# Patient Record
Sex: MALE | Race: WHITE | Employment: FULL TIME | URBAN - METROPOLITAN AREA
[De-identification: names, ages, dates, MRNs, and addresses within clinical notes are randomized per-mention and may not be internally consistent; named-entity substitution may affect disease eponyms.]

---

## 2017-01-27 PROBLEM — J45.40 MODERATE PERSISTENT ASTHMA WITHOUT COMPLICATION: Status: ACTIVE | Noted: 2017-01-27

## 2018-08-28 PROBLEM — M54.16 LEFT LUMBAR RADICULOPATHY: Status: ACTIVE | Noted: 2018-08-28

## 2018-09-25 ENCOUNTER — HOSPITAL ENCOUNTER (OUTPATIENT)
Dept: PAIN MANAGEMENT | Age: 53
Discharge: HOME | End: 2018-09-25
Payer: COMMERCIAL

## 2018-09-25 VITALS
WEIGHT: 183 LBS | RESPIRATION RATE: 16 BRPM | BODY MASS INDEX: 28.72 KG/M2 | OXYGEN SATURATION: 98 % | HEIGHT: 67 IN | TEMPERATURE: 98.6 F | DIASTOLIC BLOOD PRESSURE: 77 MMHG | SYSTOLIC BLOOD PRESSURE: 145 MMHG | HEART RATE: 89 BPM

## 2018-09-25 DIAGNOSIS — M51.36 DDD (DEGENERATIVE DISC DISEASE), LUMBAR: ICD-10-CM

## 2018-09-25 DIAGNOSIS — M54.16 LEFT LUMBAR RADICULOPATHY: Primary | ICD-10-CM

## 2018-09-25 DIAGNOSIS — J45.40 MODERATE PERSISTENT ASTHMA WITHOUT COMPLICATION: ICD-10-CM

## 2018-09-25 DIAGNOSIS — M21.372 FOOT DROP, LEFT: ICD-10-CM

## 2018-09-25 DIAGNOSIS — M48.061 SPINAL STENOSIS OF LUMBAR REGION WITHOUT NEUROGENIC CLAUDICATION: ICD-10-CM

## 2018-09-25 PROCEDURE — 99203 OFFICE O/P NEW LOW 30 MIN: CPT

## 2018-09-25 PROCEDURE — 99245 OFF/OP CONSLTJ NEW/EST HI 55: CPT | Performed by: PAIN MEDICINE

## 2018-09-25 RX ORDER — HYDROCODONE BITARTRATE AND ACETAMINOPHEN 5; 325 MG/1; MG/1
1 TABLET ORAL EVERY 6 HOURS PRN
COMMUNITY
End: 2018-10-19 | Stop reason: ALTCHOICE

## 2018-09-25 RX ORDER — IBUPROFEN 200 MG
200 TABLET ORAL EVERY 6 HOURS PRN
COMMUNITY

## 2018-09-25 ASSESSMENT — ENCOUNTER SYMPTOMS
BACK PAIN: 1
HEARTBURN: 0
VOMITING: 0
PHOTOPHOBIA: 0
NAUSEA: 0
EYES NEGATIVE: 1
COUGH: 0
BLURRED VISION: 0
RESPIRATORY NEGATIVE: 1
ABDOMINAL PAIN: 0
GASTROINTESTINAL NEGATIVE: 1
HEMOPTYSIS: 0

## 2018-09-25 ASSESSMENT — PAIN DESCRIPTION - DESCRIPTORS: DESCRIPTORS: ACHING;CONSTANT;DULL;JABBING;NAGGING;SHARP

## 2018-09-25 ASSESSMENT — PAIN DESCRIPTION - LOCATION: LOCATION: BACK;HIP;LEG

## 2018-09-25 ASSESSMENT — PAIN DESCRIPTION - ONSET: ONSET: ON-GOING

## 2018-09-25 ASSESSMENT — PAIN SCALES - GENERAL: PAINLEVEL_OUTOF10: 4

## 2018-09-25 ASSESSMENT — PAIN DESCRIPTION - DIRECTION: RADIATING_TOWARDS: LEFT LEG

## 2018-09-25 ASSESSMENT — ACTIVITIES OF DAILY LIVING (ADL): EFFECT OF PAIN ON DAILY ACTIVITIES: UNABLE TO WALK ONE BLOCK WITHOUT SEVERE PAIN

## 2018-09-25 ASSESSMENT — PAIN DESCRIPTION - ORIENTATION: ORIENTATION: LEFT

## 2018-09-25 ASSESSMENT — PAIN DESCRIPTION - FREQUENCY: FREQUENCY: CONTINUOUS

## 2018-09-25 ASSESSMENT — PAIN DESCRIPTION - PAIN TYPE: TYPE: CHRONIC PAIN

## 2018-09-25 NOTE — PROGRESS NOTES
[facility] Pain Management  Patient Pain Assessment  Consultation - Dr. Anita Vitale    Primary Care Physician: Lucia Bob MD    Chief complaint:   Chief Complaint   Patient presents with    Lower Back Pain     left hip and leg   . HISTORY OF PRESENT ILLNESS:  Angel Luis Lane is 47 y.o. male referred to the pain clinic in consultation for back pain by Valdo Abad MD    Patient is a 25-year-old male who has a history of low back pain of long-standing duration. Patient reports he used to get spasms in his left calf and long time. Patient reports he was pulling an anchor off of both and bent over and developed severe pain Next day in his low back. The pain radiates towards the left lower extremity. Patient reports he was taking Motrin and Norco for pain. He reports that he is developing severe charley horses in the calf on the left side at night. Patient's pain is decreased a certain extent with medications and lying      Back Pain   This is a chronic problem. The current episode started more than 1 month ago. The problem occurs constantly. The problem is unchanged. The pain is present in the lumbar spine and sacro-iliac. The quality of the pain is described as shooting (sharp). The pain radiates to the left foot, left thigh and left knee. The pain is at a severity of 5/10 (1-8). The pain is moderate. The symptoms are aggravated by standing, coughing and bending (walking). Associated symptoms include numbness, tingling and weakness. Pertinent negatives include no abdominal pain, bladder incontinence, chest pain, dysuria, fever or weight loss. (In toes) Risk factors include lack of exercise. OARRS compliant?  yes  Concern for prescription abuse?no    Current Pain Assessment  Pain Assessment  Pain Assessment: 0-10  Pain Level: 4  Pain Type: Chronic pain  Pain Location: Back, Hip, Leg  Pain Orientation: Left  Pain Radiating Towards: left leg  Pain Descriptors: Aching, Constant, Dull, Jabbing, Nagging, Sharp  Pain Frequency: Continuous  Pain Onset: On-going  Effect of Pain on Daily Activities: unable to walk one block without severe pain   Patient's Stated Pain Goal: 2 (to be able to walk more than one block without severe pain)  Pain Intervention(s): Medication (see eMar), Repositioned, Rest                    ADVERSE MEDICATION EFFECTS:   Constipation: no  Bowel Regimen: No:   Diet: common adult  Appetite:  ok  Sedation:  no  Urinary Retention: no    FOCUSED PAIN SCALE:  Highest : 8  Lowest :1  Average: Range-5  When and What  was your last procedure:      Was your procedure effective:  no    ACTIVITY/SOCIAL/EMOTIONAL:  Sleep Pattern: 8 hours per night. generally restful sleep  Energy Level:  Normal  Currently attending Physical Therapy:  No  Home Exercises: daily walking  Mobility: no current mobility problem   Currently seeing a Psychiatrist or Psychologist:  No  Emotional Issues: normal   Mood: appropriate     ABERRANT BEHAVIORS SINCE LAST VISIT:  Have you ever been treated in another Pain Clinic yes  Refills for prescriptions appropriate: not applicable  Lost rx/pills: not applicable  Taking more medication than prescribed:  not applicable  Are you receiving PAIN medications from  other doctors: yes tayler Townsend  Last Urine/Serum Drug Screen :  Was Serum/UDS as anticipated?  not applicable  Brought pill bottles in :not applicable   Was Pill count appropriate? :not applicable   Are currently pregnant? no  Recent ER visits: No             Past Medical History      Diagnosis Date    Asthma        Surgical History  Past Surgical History:   Procedure Laterality Date    TONSILLECTOMY         Medications  Current Outpatient Prescriptions   Medication Sig Dispense Refill    HYDROcodone-acetaminophen (NORCO) 5-325 MG per tablet Take 1 tablet by mouth every 6 hours as needed for Pain. [place] ibuprofen (ADVIL;MOTRIN) 200 MG tablet Take 200 mg by mouth every 6 hours as needed for Pain      fluticasone-salmeterol (ADVAIR DISKUS) 250-50 MCG/DOSE AEPB INHALE ONE DOSE BY MOUTH TWICE DAILY 3 Inhaler 3    albuterol sulfate HFA (PROAIR HFA) 108 (90 Base) MCG/ACT inhaler Inhale 2 puffs into the lungs every 6 hours as needed for Wheezing 3 Inhaler 3     No current facility-administered medications for this encounter. Allergies  Patient has no known allergies. Family History  family history includes COPD in his father; Coronary Art Dis in his mother. Social History  Social History     Social History    Marital status: Unknown     Spouse name: N/A    Number of children: N/A    Years of education: N/A     Social History Main Topics    Smoking status: Never Smoker    Smokeless tobacco: Current User     Types: Chew    Alcohol use Yes      Comment: Socially    Drug use: No    Sexual activity: Not Asked     Other Topics Concern    None     Social History Narrative    None      reports that he does not use drugs. REVIEW OF SYSTEMS:  Review of Systems   Constitutional: Negative for chills, fever and weight loss. HENT: Negative. Negative for hearing loss and nosebleeds. Eyes: Negative. Negative for blurred vision and photophobia. Respiratory: Negative. Negative for cough and hemoptysis. Copd OK   Cardiovascular: Negative. Negative for chest pain and palpitations. Gastrointestinal: Negative. Negative for abdominal pain, heartburn, nausea and vomiting. Genitourinary: Negative. Negative for bladder incontinence, dysuria, frequency and hematuria. Musculoskeletal: Positive for back pain and myalgias. Skin: Negative. Negative for itching and rash. Neurological: Positive for tingling, focal weakness, weakness and numbness. Negative for dizziness and tremors. Endo/Heme/Allergies: Negative. Does not bruise/bleed easily. Psychiatric/Behavioral: Negative. Negative for depression and substance abuse.             GENERAL PHYSICAL EXAM:  Vitals: BP (!) 145/77 The patient has normal right knee strength. Left Knee Exam     Muscle Strength     The patient has normal left knee strength. Right Hip Exam     Muscle Strength   The patient has normal right hip strength. Left Hip Exam     Muscle Strength   The patient has normal left hip strength. Back Exam     Tenderness   The patient is experiencing tenderness in the lumbar and sacroiliac. Range of Motion   Back extension: Limited and painful. Back flexion: Limited and painful. Back lateral bend right: Limited and painful. Back lateral bend left: Limited and painful. Back rotation right: Limited and painful. Back rotation left: Limited and painful. Tests   Straight leg raise right: negative  Straight leg raise left: positive    Other   Toe Walk: abnormal  Heel Walk: abnormal  Sensation: normal  Gait: antalgic   Erythema: no back redness  Scars: absent    Comments:  Skin --normal appearance  Surgical Scar --Absent   kyphosis absent and scoliosis absent  Alignment of her shoulders, scapulae and iliac crests--symmetric  Paraspinal tenderness:Present  Lumbar lordosis-----------Decreased  Movements of the lumbar spine indicated above are diminished range with pain   Facet loading---  : Right side--    Pain-Moderate                                   Left side----   Pain  Moderate  Yordy's test -------------- Right side---negative                                           Left side----positive            Nurses Notes and Vital Signs reviewed. DATA    Imaging:  Radiology Images and Reports reviewed where indicated and necessary    Patient Active Problem List   Diagnosis    Moderate persistent asthma without complication    Left lumbar radiculopathy        ASSESSMENT    Niall Stuart is a 47 y.o. male with     1. Left lumbar radiculopathy    2. Foot drop, left    3. DDD (degenerative disc disease), lumbar    4. Spinal stenosis of lumbar region without neurogenic claudication    5.  Moderate

## 2018-09-28 ENCOUNTER — HOSPITAL ENCOUNTER (OUTPATIENT)
Dept: PAIN MANAGEMENT | Age: 53
Discharge: HOME | End: 2018-09-28
Payer: COMMERCIAL

## 2018-09-28 ENCOUNTER — HOSPITAL ENCOUNTER (OUTPATIENT)
Dept: GENERAL RADIOLOGY | Age: 53
Discharge: HOME | End: 2018-09-30
Payer: COMMERCIAL

## 2018-09-28 VITALS
RESPIRATION RATE: 16 BRPM | WEIGHT: 183 LBS | HEIGHT: 67 IN | SYSTOLIC BLOOD PRESSURE: 142 MMHG | DIASTOLIC BLOOD PRESSURE: 87 MMHG | HEART RATE: 76 BPM | OXYGEN SATURATION: 97 % | BODY MASS INDEX: 28.72 KG/M2 | TEMPERATURE: 98.8 F

## 2018-09-28 DIAGNOSIS — M54.16 LEFT LUMBAR RADICULOPATHY: ICD-10-CM

## 2018-09-28 DIAGNOSIS — M21.372 FOOT DROP, LEFT: ICD-10-CM

## 2018-09-28 DIAGNOSIS — M51.36 DDD (DEGENERATIVE DISC DISEASE), LUMBAR: ICD-10-CM

## 2018-09-28 DIAGNOSIS — M48.061 SPINAL STENOSIS OF LUMBAR REGION WITHOUT NEUROGENIC CLAUDICATION: ICD-10-CM

## 2018-09-28 DIAGNOSIS — M54.16 LEFT LUMBAR RADICULOPATHY: Primary | ICD-10-CM

## 2018-09-28 PROCEDURE — 64483 NJX AA&/STRD TFRM EPI L/S 1: CPT

## 2018-09-28 PROCEDURE — 64483 NJX AA&/STRD TFRM EPI L/S 1: CPT | Performed by: PAIN MEDICINE

## 2018-09-28 PROCEDURE — 3209999900 FLUORO FOR SURGICAL PROCEDURES

## 2018-09-28 PROCEDURE — 64484 NJX AA&/STRD TFRM EPI L/S EA: CPT | Performed by: PAIN MEDICINE

## 2018-09-28 ASSESSMENT — PAIN DESCRIPTION - PAIN TYPE: TYPE: CHRONIC PAIN

## 2018-09-28 ASSESSMENT — PAIN DESCRIPTION - LOCATION: LOCATION: BACK

## 2018-09-28 ASSESSMENT — PAIN DESCRIPTION - ONSET: ONSET: ON-GOING

## 2018-09-28 ASSESSMENT — PAIN DESCRIPTION - ORIENTATION: ORIENTATION: LEFT

## 2018-09-28 ASSESSMENT — PAIN SCALES - GENERAL
PAINLEVEL_OUTOF10: 5
PAINLEVEL_OUTOF10: 5

## 2018-09-28 ASSESSMENT — PAIN DESCRIPTION - PROGRESSION: CLINICAL_PROGRESSION: NOT CHANGED

## 2018-09-28 ASSESSMENT — PAIN DESCRIPTION - DIRECTION: RADIATING_TOWARDS: DOWN TO LEFT FOOT

## 2018-09-28 ASSESSMENT — PAIN DESCRIPTION - FREQUENCY: FREQUENCY: CONTINUOUS

## 2018-09-28 NOTE — PROCEDURES
Pre-Procedure Note    Patient Name: Bishop Parsons   YOB: 1965  Room/Bed: Room/bed info not found  Medical Record Number: [id number]  Date: 9/28/2018       Indication:  Active Problems:    * No active hospital problems. *  Resolved Problems:    * No resolved hospital problems. *       Consent: On file. Vital Signs:   Vitals:    09/28/18 1013   BP: 139/75   Pulse: 80   Resp: 14   Temp:    SpO2: 97%       Past Medical History:   has a past medical history of Asthma. Past Surgical History:   has a past surgical history that includes Tonsillectomy. Pre-Sedation Documentation and Exam:   Vital signs have been reviewed (see flow sheet for vitals). Mallampati Airway Assessment:  normal    ASA Classification:  Class 1 - A normal healthy patient    Sedation/ Anesthesia Plan:   intravenous sedation  as needed. Medications Planned:   midazolam (Versed) / Fentanyl  Intravenously  as needed. Patient is an appropriate candidate for plan of sedation: yes  Patient's History and Physical examination was reviewed and there is no change. Electronically signed by Willa Winkler MD on 9/28/2018 at 10:19 AM        Preoperative Diagnosis:    1. Left lumbar radiculopathy    2. DDD (degenerative disc disease), lumbar    3. Spinal stenosis of lumbar region without neurogenic claudication    4. Foot drop, left         Postoperative diagnosis:    1. Left lumbar radiculopathy    2. DDD (degenerative disc disease), lumbar    3. Spinal stenosis of lumbar region without neurogenic claudication    4. Foot drop, left         Procedure Performed:  Transforaminal lumbar epidural steroid injection at the levels of L5-S1 Left side under fluoroscopic guidance without IV sedation. Indication for the Procedure: The patient failed conservative management  for pain in the low back radiating to lower extremities.   .  As the patient is not responding to conservative management and pain is interfering with activities of daily living, we decided to proceed with transforaminal lumbar epidural steroid injection as symptoms are mostly following the nerve root distribution. The procedure and the risks were discussed with the patient and informed consent was obtained. Current Pain Assessment  Pain Assessment  Pain Assessment: 0-10  Pain Level: 5  Pain Type: Chronic pain  Pain Location: Back  Pain Orientation: Left  Pain Radiating Towards: down to left foot  Pain Descriptors: Aching, Constant, Dull, Sharp, Stabbing, Jabbing, Nagging  Pain Frequency: Continuous  Pain Onset: On-going  Clinical Progression: Not changed  Effect of Pain on Daily Activities: unable to walk long distances without severe pain  Patient's Stated Pain Goal: 1 (to be able to walk without pain)  Pain Intervention(s): Medication (see eMar), Repositioned, Rest, Heat applied, Cold applied, Massage     Procedure: The patient's vital signs including BP, EKG and SaO2 were monitored by the RN and they remained stable during the procedure. A meaningful communication was kept up with the patient throughout   the procedure. The patient is placed in prone position. The skin over the back was prepped and draped in sterile manner. Then the skin and deep tissues just below the tip of the transverse process of L-5 vertebra on Left side were infiltrated with about 4 ml of 1% lidocaine. The #20-gauge, 3-1/2 inch Tuohy needle/#22-gauge, 5 inch spinal needle was inserted through the skin wheal  and under fluoroscopy guidance was directed such that the tip of the needle lies in the neuroforamina at about the 6 o'clock position under the pedicle of the L-5 vertebra. This was confirmed with AP and lateral views of the fluoroscopy. Then after negative aspiration a total of 2 ml of Omnipaque-180 was injected through the needle and spread of the contrast in the epidural space as well as along the nerve root was observed.    Then after negative aspiration a total of 80 mg of triamcinolone and 2 ml of normal saline was injected through the needle. The needle is removed and a Band-Aid was placed over the needle  insertion site. The patient's vital signs remained stable and the patient tolerated the procedure well. The patient was discharged home in stable condition and will be followed in the pain clinic in the next few weeks for further planning.     Electronically signed by Keri Thompson MD on 9/28/2018 at 10:19 AM

## 2018-09-28 NOTE — PROGRESS NOTES
Discharge criteria met. Patient alert and oriented x3  Post procedure dressing dry and intact. Sensory and motor function intact as per pre-procedure. Instructions and follow up reviewed with pt at patient at discharge.   Patient discharged ambulatory @ 10:28am      Patient discharged ambulatory accompanied by wife

## 2018-10-01 ENCOUNTER — TELEPHONE (OUTPATIENT)
Dept: PAIN MANAGEMENT | Age: 53
End: 2018-10-01

## 2018-10-19 ENCOUNTER — HOSPITAL ENCOUNTER (OUTPATIENT)
Dept: PAIN MANAGEMENT | Age: 53
Discharge: HOME | End: 2018-10-19
Payer: COMMERCIAL

## 2018-10-19 VITALS
RESPIRATION RATE: 16 BRPM | HEART RATE: 88 BPM | TEMPERATURE: 98 F | SYSTOLIC BLOOD PRESSURE: 144 MMHG | DIASTOLIC BLOOD PRESSURE: 108 MMHG | OXYGEN SATURATION: 95 %

## 2018-10-19 DIAGNOSIS — M21.372 FOOT DROP, LEFT: ICD-10-CM

## 2018-10-19 DIAGNOSIS — M51.36 DDD (DEGENERATIVE DISC DISEASE), LUMBAR: ICD-10-CM

## 2018-10-19 DIAGNOSIS — M48.061 SPINAL STENOSIS OF LUMBAR REGION WITHOUT NEUROGENIC CLAUDICATION: ICD-10-CM

## 2018-10-19 DIAGNOSIS — M54.16 LEFT LUMBAR RADICULOPATHY: Primary | ICD-10-CM

## 2018-10-19 DIAGNOSIS — J45.40 MODERATE PERSISTENT ASTHMA WITHOUT COMPLICATION: ICD-10-CM

## 2018-10-19 PROCEDURE — 99213 OFFICE O/P EST LOW 20 MIN: CPT | Performed by: NURSE PRACTITIONER

## 2018-10-19 PROCEDURE — 99213 OFFICE O/P EST LOW 20 MIN: CPT

## 2018-10-19 ASSESSMENT — ENCOUNTER SYMPTOMS
BACK PAIN: 1
GASTROINTESTINAL NEGATIVE: 1
RESPIRATORY NEGATIVE: 1
EYES NEGATIVE: 1

## 2019-01-04 ENCOUNTER — TELEPHONE (OUTPATIENT)
Dept: PRIMARY CARE CLINIC | Age: 54
End: 2019-01-04

## 2019-10-28 ENCOUNTER — OFFICE VISIT (OUTPATIENT)
Dept: PRIMARY CARE CLINIC | Age: 54
End: 2019-10-28
Payer: COMMERCIAL

## 2019-10-28 VITALS
HEART RATE: 76 BPM | SYSTOLIC BLOOD PRESSURE: 132 MMHG | BODY MASS INDEX: 29.29 KG/M2 | HEIGHT: 67 IN | DIASTOLIC BLOOD PRESSURE: 82 MMHG | WEIGHT: 186.6 LBS | RESPIRATION RATE: 16 BRPM | OXYGEN SATURATION: 97 %

## 2019-10-28 DIAGNOSIS — Z12.11 ENCOUNTER FOR SCREENING FOR MALIGNANT NEOPLASM OF COLON: ICD-10-CM

## 2019-10-28 DIAGNOSIS — J45.40 MODERATE PERSISTENT ASTHMA WITHOUT COMPLICATION: ICD-10-CM

## 2019-10-28 DIAGNOSIS — Z00.00 ANNUAL PHYSICAL EXAM: Primary | ICD-10-CM

## 2019-10-28 DIAGNOSIS — Z13.1 DIABETES MELLITUS SCREENING: ICD-10-CM

## 2019-10-28 DIAGNOSIS — Z12.5 SCREENING PSA (PROSTATE SPECIFIC ANTIGEN): ICD-10-CM

## 2019-10-28 DIAGNOSIS — Z23 ENCOUNTER FOR IMMUNIZATION: ICD-10-CM

## 2019-10-28 DIAGNOSIS — Z13.220 ENCOUNTER FOR LIPID SCREENING FOR CARDIOVASCULAR DISEASE: ICD-10-CM

## 2019-10-28 DIAGNOSIS — Z13.6 ENCOUNTER FOR LIPID SCREENING FOR CARDIOVASCULAR DISEASE: ICD-10-CM

## 2019-10-28 PROCEDURE — 90686 IIV4 VACC NO PRSV 0.5 ML IM: CPT | Performed by: NURSE PRACTITIONER

## 2019-10-28 PROCEDURE — 90471 IMMUNIZATION ADMIN: CPT | Performed by: NURSE PRACTITIONER

## 2019-10-28 PROCEDURE — 99396 PREV VISIT EST AGE 40-64: CPT | Performed by: NURSE PRACTITIONER

## 2019-10-28 ASSESSMENT — PATIENT HEALTH QUESTIONNAIRE - PHQ9
SUM OF ALL RESPONSES TO PHQ QUESTIONS 1-9: 0
SUM OF ALL RESPONSES TO PHQ QUESTIONS 1-9: 0
2. FEELING DOWN, DEPRESSED OR HOPELESS: 0
SUM OF ALL RESPONSES TO PHQ9 QUESTIONS 1 & 2: 0
1. LITTLE INTEREST OR PLEASURE IN DOING THINGS: 0

## 2019-10-28 ASSESSMENT — ENCOUNTER SYMPTOMS
SHORTNESS OF BREATH: 0
CONSTIPATION: 0
EYE REDNESS: 0
DIARRHEA: 0
HEMOPTYSIS: 0
SPUTUM PRODUCTION: 0
WHEEZING: 0
BACK PAIN: 0
COUGH: 0
NAUSEA: 0
DIFFICULTY BREATHING: 0
CHEST TIGHTNESS: 0
EYE PAIN: 0

## 2020-04-21 ENCOUNTER — OFFICE VISIT (OUTPATIENT)
Dept: PRIMARY CARE CLINIC | Age: 55
End: 2020-04-21
Payer: COMMERCIAL

## 2020-04-21 VITALS
WEIGHT: 194.4 LBS | TEMPERATURE: 98.2 F | DIASTOLIC BLOOD PRESSURE: 84 MMHG | BODY MASS INDEX: 30.51 KG/M2 | SYSTOLIC BLOOD PRESSURE: 136 MMHG | HEART RATE: 83 BPM | HEIGHT: 67 IN | OXYGEN SATURATION: 96 %

## 2020-04-21 PROCEDURE — 99213 OFFICE O/P EST LOW 20 MIN: CPT | Performed by: FAMILY MEDICINE

## 2020-04-21 RX ORDER — AMOXICILLIN AND CLAVULANATE POTASSIUM 875; 125 MG/1; MG/1
1 TABLET, FILM COATED ORAL 2 TIMES DAILY
Qty: 20 TABLET | Refills: 0 | Status: SHIPPED | OUTPATIENT
Start: 2020-04-21 | End: 2020-05-01

## 2020-04-21 RX ORDER — GENTAMICIN SULFATE 3 MG/ML
SOLUTION/ DROPS OPHTHALMIC
Qty: 15 ML | Refills: 0 | Status: SHIPPED | OUTPATIENT
Start: 2020-04-21 | End: 2022-06-17

## 2020-04-21 ASSESSMENT — ENCOUNTER SYMPTOMS
NAUSEA: 0
ABDOMINAL PAIN: 0
VOMITING: 0
EYE DISCHARGE: 0
RHINORRHEA: 0
WHEEZING: 0
EYE REDNESS: 0
COUGH: 0
SORE THROAT: 0
SHORTNESS OF BREATH: 0
DIARRHEA: 0

## 2020-04-21 ASSESSMENT — PATIENT HEALTH QUESTIONNAIRE - PHQ9
SUM OF ALL RESPONSES TO PHQ QUESTIONS 1-9: 0
1. LITTLE INTEREST OR PLEASURE IN DOING THINGS: 0
SUM OF ALL RESPONSES TO PHQ QUESTIONS 1-9: 0
2. FEELING DOWN, DEPRESSED OR HOPELESS: 0
SUM OF ALL RESPONSES TO PHQ9 QUESTIONS 1 & 2: 0

## 2020-04-21 NOTE — PROGRESS NOTES
[facility]  [address]  [facility] [place] [address]  Dept: [phone]    Melisa Kang is a 49 y.o. male who presents today for his medical conditions/complaintsas noted below. Chief Complaint   Patient presents with    Other     Ear discharge right ear       HPI:     HPI  Pt with discharge from the right ear few days ago. No pain. No results found for: LDLCHOLESTEROL, LDLCALC    (goal LDL is <100)   No results found for: AST, ALT, BUN  BP Readings from Last 3 Encounters:   04/21/20 136/84   10/28/19 132/82   10/19/18 (!) 144/108          (goal 120/80)    Past Medical History:   Diagnosis Date    Asthma       Past Surgical History:   Procedure Laterality Date    TONSILLECTOMY         Family History   Problem Relation Age of Onset    Coronary Art Dis Mother     COPD Father        Social History     Tobacco Use    Smoking status: Never Smoker    Smokeless tobacco: Current User     Types: Chew   Substance Use Topics    Alcohol use: Yes     Comment: Socially      Current Outpatient Medications   Medication Sig Dispense Refill    gentamicin (GARAMYCIN) 0.3 % ophthalmic solution 4 drops to right ear four times a day 15 mL 0    amoxicillin-clavulanate (AUGMENTIN) 875-125 MG per tablet Take 1 tablet by mouth 2 times daily for 10 days 20 tablet 0    fluticasone-salmeterol (ADVAIR DISKUS) 250-50 MCG/DOSE AEPB INHALE ONE DOSE BY MOUTH TWICE DAILY 3 Inhaler 5    ibuprofen (ADVIL;MOTRIN) 200 MG tablet Take 200 mg by mouth every 6 hours as needed for Pain       No current facility-administered medications for this visit.       No Known Allergies    Health Maintenance   Topic Date Due    HIV screen  11/10/1981    Lipid screen  11/10/2006    Diabetes screen  11/10/2006    DTaP/Tdap/Td vaccine (2 - Td) 01/01/2016    Shingles Vaccine (1 of 2) 11/10/2016    Colon cancer screen colonoscopy  11/10/2016    Flu vaccine  Completed    Pneumococcal 0-64 years Findings: No rash. Neurological:      Mental Status: He is alert and oriented to person, place, and time. Psychiatric:         Behavior: Behavior normal.         Thought Content: Thought content normal.         Assessment:       Diagnosis Orders   1. Perforation of right tympanic membrane  gentamicin (GARAMYCIN) 0.3 % ophthalmic solution    amoxicillin-clavulanate (AUGMENTIN) 875-125 MG per tablet   2. Encounter for screening for malignant neoplasm of colon  Cologuard   3. Screening PSA (prostate specific antigen)  Psa screening   4. Encounter for lipid screening for cardiovascular disease  Lipid, Fasting   5. Annual physical exam  Basic Metabolic Panel, Fasting    Hepatic Function Panel        Plan:    Augmentin and gentamicin ophthalmic drops to the right ear. Blood work ordered. Cologuard ordered. Recheck tympanic membrane 3 months    Return in about 3 months (around 2020). Orders Placed This Encounter   Procedures    Cologuard     This test is performed by an external laboratory and is used for result attachment only. Please fill out the appropriate paperwork required by the processing laboratory. See [email or web address] for further information.      Standing Status:   Future     Standing Expiration Date:   2020    Lipid, Fasting     Standing Status:   Future     Standing Expiration Date:   2020    Basic Metabolic Panel, Fasting     Standing Status:   Future     Standing Expiration Date:   2020    Hepatic Function Panel     Standing Status:   Future     Standing Expiration Date:   2020    Psa screening     Standing Status:   Future     Standing Expiration Date:   2020     Orders Placed This Encounter   Medications    gentamicin (GARAMYCIN) 0.3 % ophthalmic solution     Si drops to right ear four times a day     Dispense:  15 mL     Refill:  0    amoxicillin-clavulanate (AUGMENTIN) 875-125 MG per tablet     Sig: Take 1 tablet by mouth 2 times daily for 10 days

## 2021-11-09 DIAGNOSIS — J45.40 MODERATE PERSISTENT ASTHMA WITHOUT COMPLICATION: ICD-10-CM

## 2021-11-12 ENCOUNTER — TELEPHONE (OUTPATIENT)
Dept: PRIMARY CARE CLINIC | Age: 56
End: 2021-11-12

## 2021-11-12 DIAGNOSIS — J45.40 MODERATE PERSISTENT ASTHMA WITHOUT COMPLICATION: ICD-10-CM

## 2022-02-25 DIAGNOSIS — J45.40 MODERATE PERSISTENT ASTHMA WITHOUT COMPLICATION: ICD-10-CM

## 2022-02-25 NOTE — TELEPHONE ENCOUNTER
LAST VISIT:   4/21/2020     Future Appointments   Date Time Provider Josue Infante   3/3/2022  8:20 AM Lux Martínez MD [facility] [address] [facility]

## 2022-02-25 NOTE — TELEPHONE ENCOUNTER
----- Message from Elizabeth Silvestre sent at 2/25/2022 10:07 AM EST -----  Subject: Refill Request    QUESTIONS  Name of Medication? fluticasone-salmeterol (ADVAIR DISKUS) 250-50 MCG/DOSE   AEPB  Patient-reported dosage and instructions? INHALE ONE DOSE BY MOUTH TWICE   DAILY  How many days do you have left? 4  Preferred Pharmacy? [address]  Pharmacy phone number (if available)? [phone]  ---------------------------------------------------------------------------  --------------  CALL BACK INFO  What is the best way for the office to contact you? OK to leave message on   voicemail  Preferred Call Back Phone Number?  [phone]

## 2022-06-17 ENCOUNTER — OFFICE VISIT (OUTPATIENT)
Dept: PRIMARY CARE CLINIC | Age: 57
End: 2022-06-17
Payer: COMMERCIAL

## 2022-06-17 VITALS
WEIGHT: 174.5 LBS | SYSTOLIC BLOOD PRESSURE: 132 MMHG | HEART RATE: 74 BPM | HEIGHT: 67 IN | OXYGEN SATURATION: 98 % | BODY MASS INDEX: 27.39 KG/M2 | DIASTOLIC BLOOD PRESSURE: 72 MMHG

## 2022-06-17 DIAGNOSIS — Z12.5 SCREENING PSA (PROSTATE SPECIFIC ANTIGEN): ICD-10-CM

## 2022-06-17 DIAGNOSIS — Z13.6 ENCOUNTER FOR LIPID SCREENING FOR CARDIOVASCULAR DISEASE: ICD-10-CM

## 2022-06-17 DIAGNOSIS — Z23 NEED FOR VIRAL IMMUNIZATION: ICD-10-CM

## 2022-06-17 DIAGNOSIS — Z00.00 ANNUAL PHYSICAL EXAM: ICD-10-CM

## 2022-06-17 DIAGNOSIS — J45.40 MODERATE PERSISTENT ASTHMA WITHOUT COMPLICATION: ICD-10-CM

## 2022-06-17 DIAGNOSIS — G62.9 NEUROPATHY: Primary | ICD-10-CM

## 2022-06-17 DIAGNOSIS — Z13.220 ENCOUNTER FOR LIPID SCREENING FOR CARDIOVASCULAR DISEASE: ICD-10-CM

## 2022-06-17 PROCEDURE — 90715 TDAP VACCINE 7 YRS/> IM: CPT | Performed by: FAMILY MEDICINE

## 2022-06-17 PROCEDURE — 90471 IMMUNIZATION ADMIN: CPT | Performed by: FAMILY MEDICINE

## 2022-06-17 PROCEDURE — 99214 OFFICE O/P EST MOD 30 MIN: CPT | Performed by: FAMILY MEDICINE

## 2022-06-17 PROCEDURE — 90472 IMMUNIZATION ADMIN EACH ADD: CPT | Performed by: FAMILY MEDICINE

## 2022-06-17 PROCEDURE — 90750 HZV VACC RECOMBINANT IM: CPT | Performed by: FAMILY MEDICINE

## 2022-06-17 RX ORDER — FLUTICASONE PROPIONATE AND SALMETEROL 250; 50 UG/1; UG/1
1 POWDER RESPIRATORY (INHALATION) EVERY 12 HOURS
Qty: 3 EACH | Refills: 3 | Status: SHIPPED | OUTPATIENT
Start: 2022-06-17

## 2022-06-17 SDOH — ECONOMIC STABILITY: FOOD INSECURITY: WITHIN THE PAST 12 MONTHS, YOU WORRIED THAT YOUR FOOD WOULD RUN OUT BEFORE YOU GOT MONEY TO BUY MORE.: NEVER TRUE

## 2022-06-17 SDOH — ECONOMIC STABILITY: FOOD INSECURITY: WITHIN THE PAST 12 MONTHS, THE FOOD YOU BOUGHT JUST DIDN'T LAST AND YOU DIDN'T HAVE MONEY TO GET MORE.: NEVER TRUE

## 2022-06-17 ASSESSMENT — ENCOUNTER SYMPTOMS
VOMITING: 0
ABDOMINAL PAIN: 0
EYE REDNESS: 0
SHORTNESS OF BREATH: 0
COUGH: 0
RHINORRHEA: 0
WHEEZING: 0
NAUSEA: 0
EYE DISCHARGE: 0
DIARRHEA: 0
SORE THROAT: 0

## 2022-06-17 ASSESSMENT — SOCIAL DETERMINANTS OF HEALTH (SDOH): HOW HARD IS IT FOR YOU TO PAY FOR THE VERY BASICS LIKE FOOD, HOUSING, MEDICAL CARE, AND HEATING?: NOT HARD AT ALL

## 2022-06-17 ASSESSMENT — PATIENT HEALTH QUESTIONNAIRE - PHQ9
SUM OF ALL RESPONSES TO PHQ QUESTIONS 1-9: 0
SUM OF ALL RESPONSES TO PHQ9 QUESTIONS 1 & 2: 0
SUM OF ALL RESPONSES TO PHQ QUESTIONS 1-9: 0
SUM OF ALL RESPONSES TO PHQ QUESTIONS 1-9: 0
1. LITTLE INTEREST OR PLEASURE IN DOING THINGS: 0
2. FEELING DOWN, DEPRESSED OR HOPELESS: 0
SUM OF ALL RESPONSES TO PHQ QUESTIONS 1-9: 0

## 2022-06-17 NOTE — PROGRESS NOTES
(ADVAIR) 250-50 MCG/ACT AEPB diskus inhaler Inhale 1 puff into the lungs every 12 hours 3 each 3    ibuprofen (ADVIL;MOTRIN) 200 MG tablet Take 200 mg by mouth every 6 hours as needed for Pain       No current facility-administered medications for this visit. No Known Allergies    Health Maintenance   Topic Date Due    Depression Screen  Never done    HIV screen  Never done    Hepatitis C screen  Never done    Diabetes screen  Never done    Lipids  Never done    Prostate Specific Antigen (PSA) Screening or Monitoring  Never done    DTaP/Tdap/Td vaccine (2 - Td or Tdap) 01/01/2016    Shingles vaccine (1 of 2) Never done    Flu vaccine (Season Ended) 09/01/2022    Colorectal Cancer Screen  05/09/2023    Pneumococcal 0-64 years Vaccine (3 - PPSV23 or PCV20) 11/10/2031    COVID-19 Vaccine  Completed    Hepatitis A vaccine  Aged Out    Hepatitis B vaccine  Aged Out    Hib vaccine  Aged Out    Meningococcal (ACWY) vaccine  Aged Out       Subjective:      Review of Systems   Constitutional: Negative for chills and fever. HENT: Negative for rhinorrhea and sore throat. Eyes: Negative for discharge and redness. Respiratory: Negative for cough, shortness of breath and wheezing. Cardiovascular: Negative for chest pain and palpitations. Gastrointestinal: Negative for abdominal pain, diarrhea, nausea and vomiting. Genitourinary: Negative for dysuria and frequency. Musculoskeletal: Negative for arthralgias and myalgias. Neurological: Negative for dizziness, light-headedness and headaches. Psychiatric/Behavioral: Negative for sleep disturbance. Objective:     /72   Pulse 74   Ht 5' 6.96\" (1.701 m)   Wt 174 lb 8 oz (79.2 kg)   SpO2 98%   BMI 27.36 kg/m²   Physical Exam  Vitals and nursing note reviewed. Constitutional:       General: He is not in acute distress. Appearance: He is well-developed. He is not ill-appearing. HENT:      Head: Normocephalic and atraumatic. Right Ear: External ear normal.      Left Ear: External ear normal.   Eyes:      General: No scleral icterus. Right eye: No discharge. Left eye: No discharge. Conjunctiva/sclera: Conjunctivae normal.   Neck:      Thyroid: No thyromegaly. Trachea: No tracheal deviation. Cardiovascular:      Rate and Rhythm: Normal rate and regular rhythm. Heart sounds: Normal heart sounds. Pulmonary:      Effort: Pulmonary effort is normal. No respiratory distress. Breath sounds: Normal breath sounds. No wheezing. Lymphadenopathy:      Cervical: No cervical adenopathy. Skin:     General: Skin is warm. Findings: No rash. Neurological:      Mental Status: He is alert and oriented to person, place, and time. Psychiatric:         Mood and Affect: Mood normal.         Behavior: Behavior normal.         Thought Content: Thought content normal.         Assessment:       Diagnosis Orders   1. Neuropathy  EMG    Vitamin B12    TSH   2. Encounter for lipid screening for cardiovascular disease  Lipid, Fasting   3. Annual physical exam  Basic Metabolic Panel, Fasting    Hepatic Function Panel    Hepatitis C Antibody   4. Screening PSA (prostate specific antigen)  PSA Screening   5. Need for viral immunization  Tdap, BOOSTRIX, (age 8 yrs+), IM    Zoster, SHINGRIX, (50 yrs +), IM   6. Moderate persistent asthma without complication  fluticasone-salmeterol (ADVAIR) 250-50 MCG/ACT AEPB diskus inhaler        Plan:    Blood work ordered  EMG  Old EMG was reviewed  Shingrix and Boostrix today  Renew Advair  Will review blood work in light of 20 pound weight loss    Return if symptoms worsen or fail to improve.     Orders Placed This Encounter   Procedures    Tdap, [address], (age 8 yrs+), IM    Zoster, SHINGRIX, (50 yrs +), IM    Lipid, Fasting     Standing Status:   Future     Standing Expiration Date:   9/17/2022    Basic Metabolic Panel, Fasting     Standing Status:   Future     Standing Expiration Date:   9/17/2022    Hepatic Function Panel     Standing Status:   Future     Standing Expiration Date:   9/17/2022    PSA Screening     Standing Status:   Future     Standing Expiration Date:   9/17/2022    Vitamin B12     Standing Status:   Future     Standing Expiration Date:   6/17/2023    TSH     Standing Status:   Future     Standing Expiration Date:   6/17/2023    Hepatitis C Antibody     Standing Status:   Future     Standing Expiration Date:   6/17/2023    EMG     Standing Status:   Future     Standing Expiration Date:   8/16/2022     Order Specific Question:   Which body part? Answer:   legs and feet     Orders Placed This Encounter   Medications    fluticasone-salmeterol (ADVAIR) 250-50 MCG/ACT AEPB diskus inhaler     Sig: Inhale 1 puff into the lungs every 12 hours     Dispense:  3 each     Refill:  3       Patient given educational materials - see patient instructions. Discussed use, benefit, and side effects of prescribed medications. All patient questions answered. Pt voiced understanding. Reviewed health maintenance. Instructed to continue current medications, diet andexercise. Patient agreed with treatment plan. Follow up as directed.      Electronicallysigned by Tin Hernandez MD on 6/17/2022 at 9:23 AM

## 2022-08-19 ENCOUNTER — PROCEDURE VISIT (OUTPATIENT)
Dept: PHYSICAL MEDICINE AND REHAB | Age: 57
End: 2022-08-19
Payer: COMMERCIAL

## 2022-08-19 DIAGNOSIS — G62.9 SENSORY NEUROPATHY: ICD-10-CM

## 2022-08-19 DIAGNOSIS — G62.9 NEUROPATHY: ICD-10-CM

## 2022-08-19 DIAGNOSIS — Z12.5 SCREENING PSA (PROSTATE SPECIFIC ANTIGEN): ICD-10-CM

## 2022-08-19 DIAGNOSIS — M54.17 RADICULOPATHY, LUMBOSACRAL REGION: Primary | ICD-10-CM

## 2022-08-19 DIAGNOSIS — Z00.00 ANNUAL PHYSICAL EXAM: ICD-10-CM

## 2022-08-19 DIAGNOSIS — Z13.6 ENCOUNTER FOR LIPID SCREENING FOR CARDIOVASCULAR DISEASE: ICD-10-CM

## 2022-08-19 DIAGNOSIS — Z13.220 ENCOUNTER FOR LIPID SCREENING FOR CARDIOVASCULAR DISEASE: ICD-10-CM

## 2022-08-19 LAB
ALBUMIN SERPL-MCNC: 4.8 G/DL (ref 3.5–5.2)
ALBUMIN/GLOBULIN RATIO: 1.6 (ref 1–2.5)
ALP BLD-CCNC: 81 U/L (ref 40–129)
ALT SERPL-CCNC: 32 U/L (ref 5–41)
ANION GAP SERPL CALCULATED.3IONS-SCNC: 17 MMOL/L (ref 9–17)
AST SERPL-CCNC: 33 U/L
BILIRUB SERPL-MCNC: 0.25 MG/DL (ref 0.3–1.2)
BILIRUBIN DIRECT: 0.09 MG/DL
BILIRUBIN, INDIRECT: 0.16 MG/DL (ref 0–1)
BUN BLDV-MCNC: 8 MG/DL (ref 6–20)
CALCIUM SERPL-MCNC: 9.1 MG/DL (ref 8.6–10.4)
CHLORIDE BLD-SCNC: 103 MMOL/L (ref 98–107)
CHOLESTEROL, FASTING: 198 MG/DL
CHOLESTEROL/HDL RATIO: 2.8
CO2: 21 MMOL/L (ref 20–31)
CREAT SERPL-MCNC: 0.74 MG/DL (ref 0.7–1.2)
GFR AFRICAN AMERICAN: >60 ML/MIN
GFR NON-AFRICAN AMERICAN: >60 ML/MIN
GFR SERPL CREATININE-BSD FRML MDRD: NORMAL ML/MIN/{1.73_M2}
GLUCOSE FASTING: 80 MG/DL (ref 70–99)
HDLC SERPL-MCNC: 72 MG/DL
HEPATITIS C ANTIBODY: NONREACTIVE
LDL CHOLESTEROL: 111 MG/DL (ref 0–130)
POTASSIUM SERPL-SCNC: 4.3 MMOL/L (ref 3.7–5.3)
PROSTATE SPECIFIC ANTIGEN: 0.78 NG/ML
SODIUM BLD-SCNC: 141 MMOL/L (ref 135–144)
TOTAL PROTEIN: 7.8 G/DL (ref 6.4–8.3)
TRIGLYCERIDE, FASTING: 75 MG/DL
TSH SERPL DL<=0.05 MIU/L-ACNC: 1.45 UIU/ML (ref 0.3–5)
VITAMIN B-12: 587 PG/ML (ref 232–1245)

## 2022-08-19 PROCEDURE — 95909 NRV CNDJ TST 5-6 STUDIES: CPT | Performed by: PHYSICAL MEDICINE & REHABILITATION

## 2022-08-19 PROCEDURE — 95886 MUSC TEST DONE W/N TEST COMP: CPT | Performed by: PHYSICAL MEDICINE & REHABILITATION

## 2022-08-24 NOTE — RESULT ENCOUNTER NOTE
Advise pt consistent with lumbar radiculopathy. If symptoms continue, recommend mri lumbar spine without contrast for further evaluation .

## 2022-08-25 DIAGNOSIS — M54.16 LUMBAR BACK PAIN WITH RADICULOPATHY AFFECTING LOWER EXTREMITY: Primary | ICD-10-CM

## 2022-09-16 ENCOUNTER — NURSE ONLY (OUTPATIENT)
Dept: PRIMARY CARE CLINIC | Age: 57
End: 2022-09-16
Payer: COMMERCIAL

## 2022-09-16 VITALS — DIASTOLIC BLOOD PRESSURE: 80 MMHG | OXYGEN SATURATION: 94 % | HEART RATE: 74 BPM | SYSTOLIC BLOOD PRESSURE: 130 MMHG

## 2022-09-16 DIAGNOSIS — Z23 NEED FOR VACCINATION: Primary | ICD-10-CM

## 2022-09-16 PROCEDURE — 90750 HZV VACC RECOMBINANT IM: CPT | Performed by: FAMILY MEDICINE

## 2022-09-16 PROCEDURE — 90471 IMMUNIZATION ADMIN: CPT | Performed by: FAMILY MEDICINE

## 2022-09-16 NOTE — PROGRESS NOTES
After obtaining consent, and per orders of Dr. Sanam Zheng, injection of Shingrix given in Left deltoid by Sourav Du.

## 2022-09-19 ENCOUNTER — HOSPITAL ENCOUNTER (OUTPATIENT)
Dept: MRI IMAGING | Age: 57
Discharge: HOME | End: 2022-09-21
Payer: COMMERCIAL

## 2022-09-19 DIAGNOSIS — M54.16 LUMBAR BACK PAIN WITH RADICULOPATHY AFFECTING LOWER EXTREMITY: ICD-10-CM

## 2022-09-19 PROCEDURE — 72148 MRI LUMBAR SPINE W/O DYE: CPT

## 2022-09-28 DIAGNOSIS — M48.061 SPINAL STENOSIS OF LUMBAR REGION WITHOUT NEUROGENIC CLAUDICATION: Primary | ICD-10-CM

## 2023-03-30 ENCOUNTER — OFFICE VISIT (OUTPATIENT)
Dept: PRIMARY CARE CLINIC | Age: 58
End: 2023-03-30
Payer: COMMERCIAL

## 2023-03-30 VITALS
DIASTOLIC BLOOD PRESSURE: 82 MMHG | TEMPERATURE: 98.2 F | BODY MASS INDEX: 27.16 KG/M2 | HEART RATE: 84 BPM | OXYGEN SATURATION: 95 % | WEIGHT: 173.4 LBS | SYSTOLIC BLOOD PRESSURE: 160 MMHG

## 2023-03-30 DIAGNOSIS — J01.90 ACUTE NON-RECURRENT SINUSITIS, UNSPECIFIED LOCATION: ICD-10-CM

## 2023-03-30 DIAGNOSIS — H66.012 NON-RECURRENT ACUTE SUPPURATIVE OTITIS MEDIA OF LEFT EAR WITH SPONTANEOUS RUPTURE OF TYMPANIC MEMBRANE: Primary | ICD-10-CM

## 2023-03-30 PROCEDURE — 99213 OFFICE O/P EST LOW 20 MIN: CPT | Performed by: FAMILY MEDICINE

## 2023-03-30 RX ORDER — AMOXICILLIN 500 MG/1
1000 CAPSULE ORAL 2 TIMES DAILY
Qty: 40 CAPSULE | Refills: 0 | Status: SHIPPED | OUTPATIENT
Start: 2023-03-30 | End: 2023-04-09

## 2023-03-30 RX ORDER — GUAIFENESIN 600 MG/1
600 TABLET, EXTENDED RELEASE ORAL 2 TIMES DAILY
Qty: 20 TABLET | Refills: 0 | COMMUNITY
Start: 2023-03-30 | End: 2023-04-09

## 2023-03-30 RX ORDER — OXYMETAZOLINE HYDROCHLORIDE 0.05 G/100ML
2 SPRAY NASAL 2 TIMES DAILY
Refills: 0 | COMMUNITY
Start: 2023-03-30 | End: 2023-04-29

## 2023-03-30 SDOH — ECONOMIC STABILITY: INCOME INSECURITY: HOW HARD IS IT FOR YOU TO PAY FOR THE VERY BASICS LIKE FOOD, HOUSING, MEDICAL CARE, AND HEATING?: NOT HARD AT ALL

## 2023-03-30 SDOH — ECONOMIC STABILITY: FOOD INSECURITY: WITHIN THE PAST 12 MONTHS, YOU WORRIED THAT YOUR FOOD WOULD RUN OUT BEFORE YOU GOT MONEY TO BUY MORE.: NEVER TRUE

## 2023-03-30 SDOH — ECONOMIC STABILITY: FOOD INSECURITY: WITHIN THE PAST 12 MONTHS, THE FOOD YOU BOUGHT JUST DIDN'T LAST AND YOU DIDN'T HAVE MONEY TO GET MORE.: NEVER TRUE

## 2023-03-30 SDOH — ECONOMIC STABILITY: HOUSING INSECURITY
IN THE LAST 12 MONTHS, WAS THERE A TIME WHEN YOU DID NOT HAVE A STEADY PLACE TO SLEEP OR SLEPT IN A SHELTER (INCLUDING NOW)?: NO

## 2023-03-30 SDOH — ECONOMIC STABILITY: TRANSPORTATION INSECURITY
IN THE PAST 12 MONTHS, HAS LACK OF TRANSPORTATION KEPT YOU FROM MEETINGS, WORK, OR FROM GETTING THINGS NEEDED FOR DAILY LIVING?: NO

## 2023-03-30 ASSESSMENT — PATIENT HEALTH QUESTIONNAIRE - PHQ9
SUM OF ALL RESPONSES TO PHQ QUESTIONS 1-9: 0
SUM OF ALL RESPONSES TO PHQ QUESTIONS 1-9: 0
SUM OF ALL RESPONSES TO PHQ9 QUESTIONS 1 & 2: 0
SUM OF ALL RESPONSES TO PHQ QUESTIONS 1-9: 0
2. FEELING DOWN, DEPRESSED OR HOPELESS: 0
SUM OF ALL RESPONSES TO PHQ QUESTIONS 1-9: 0
1. LITTLE INTEREST OR PLEASURE IN DOING THINGS: 0

## 2023-03-30 ASSESSMENT — ENCOUNTER SYMPTOMS
COUGH: 0
VOMITING: 0
DIARRHEA: 0
SINUS PAIN: 1
SORE THROAT: 0
NAUSEA: 0
SHORTNESS OF BREATH: 0

## 2023-03-30 NOTE — PROGRESS NOTES
Rate and Rhythm: Normal rate and regular rhythm. Heart sounds: Normal heart sounds. Pulmonary:      Effort: Pulmonary effort is normal. No respiratory distress. Breath sounds: Normal breath sounds. No wheezing. Lymphadenopathy:      Cervical: No cervical adenopathy. Skin:     General: Skin is warm. Findings: No rash. Neurological:      Mental Status: He is alert and oriented to person, place, and time. Psychiatric:         Mood and Affect: Mood normal.         Behavior: Behavior normal.         Thought Content: Thought content normal.       Assessment/Plan:   1. Non-recurrent acute suppurative otitis media of left ear with spontaneous rupture of tympanic membrane  2. Acute non-recurrent sinusitis, unspecified location     Return if symptoms worsen or fail to improve. Data Unavailable     No orders of the defined types were placed in this encounter. Orders Placed This Encounter   Medications    amoxicillin (AMOXIL) 500 MG capsule     Sig: Take 2 capsules by mouth 2 times daily for 10 days     Dispense:  40 capsule     Refill:  0    guaiFENesin (MUCINEX) 600 MG extended release tablet     Sig: Take 1 tablet by mouth 2 times daily for 10 days     Dispense:  20 tablet     Refill:  0    oxymetazoline (AFRIN NODRIP SINUS) 0.05 % nasal spray     Si sprays by Nasal route 2 times daily     Refill:  0         Patient given educational materials - see patient instructions. Discussed use, benefit, and side effects of prescribed medications. All patient questions answered. Pt voiced understanding. Reviewed health maintenance. Instructed to continue current medications, diet and exercise. Patient agreed with treatment plan. Follow up as directed.      Electronically signed by Fransisca Francis MD on 3/30/2023 at 3:52 PM

## 2023-12-06 ENCOUNTER — HOSPITAL ENCOUNTER (OUTPATIENT)
Age: 58
Setting detail: SPECIMEN
Discharge: HOME | End: 2023-12-06

## 2023-12-11 LAB — SURGICAL PATHOLOGY REPORT: NORMAL

## 2024-11-10 DIAGNOSIS — J45.40 MODERATE PERSISTENT ASTHMA WITHOUT COMPLICATION: ICD-10-CM

## 2024-11-12 RX ORDER — FLUTICASONE PROPIONATE AND SALMETEROL 250; 50 UG/1; UG/1
1 POWDER RESPIRATORY (INHALATION) EVERY 12 HOURS
Qty: 3 EACH | Refills: 3 | Status: SHIPPED | OUTPATIENT
Start: 2024-11-12